# Patient Record
Sex: FEMALE | Race: OTHER | Employment: UNEMPLOYED | URBAN - METROPOLITAN AREA
[De-identification: names, ages, dates, MRNs, and addresses within clinical notes are randomized per-mention and may not be internally consistent; named-entity substitution may affect disease eponyms.]

---

## 2024-03-04 ENCOUNTER — HOSPITAL ENCOUNTER (EMERGENCY)
Facility: HOSPITAL | Age: 29
Discharge: HOME | End: 2024-03-04
Attending: EMERGENCY MEDICINE | Admitting: EMERGENCY MEDICINE
Payer: MEDICAID

## 2024-03-04 VITALS
OXYGEN SATURATION: 97 % | HEIGHT: 62 IN | HEART RATE: 109 BPM | WEIGHT: 154.1 LBS | TEMPERATURE: 100.2 F | DIASTOLIC BLOOD PRESSURE: 58 MMHG | RESPIRATION RATE: 20 BRPM | BODY MASS INDEX: 28.36 KG/M2 | SYSTOLIC BLOOD PRESSURE: 121 MMHG

## 2024-03-04 DIAGNOSIS — J11.1 INFLUENZA: Primary | ICD-10-CM

## 2024-03-04 LAB
BACTERIA UR QL AUTO: ABNORMAL /HPF
BILIRUB UR QL STRIP: NEGATIVE
CLARITY UR: CLEAR
COLOR UR: ABNORMAL
EXT PREGNANCY TEST URINE: NEGATIVE
EXT. CONTROL: NORMAL
FLUAV RNA RESP QL NAA+PROBE: POSITIVE
FLUBV RNA RESP QL NAA+PROBE: NEGATIVE
GLUCOSE UR STRIP-MCNC: NEGATIVE MG/DL
HGB UR QL STRIP.AUTO: NEGATIVE
KETONES UR STRIP-MCNC: NEGATIVE MG/DL
LEUKOCYTE ESTERASE UR QL STRIP: ABNORMAL
MUCOUS THREADS UR QL AUTO: ABNORMAL
NITRITE UR QL STRIP: NEGATIVE
NON-SQ EPI CELLS URNS QL MICRO: ABNORMAL /HPF
PH UR STRIP.AUTO: 5.5 [PH]
PROT UR STRIP-MCNC: NEGATIVE MG/DL
RBC #/AREA URNS AUTO: ABNORMAL /HPF
RSV RNA RESP QL NAA+PROBE: NEGATIVE
SARS-COV-2 RNA RESP QL NAA+PROBE: NEGATIVE
SP GR UR STRIP.AUTO: 1.02 (ref 1–1.03)
UROBILINOGEN UR STRIP-ACNC: <2 MG/DL
WBC #/AREA URNS AUTO: ABNORMAL /HPF

## 2024-03-04 PROCEDURE — 0241U HB NFCT DS VIR RESP RNA 4 TRGT: CPT | Performed by: EMERGENCY MEDICINE

## 2024-03-04 PROCEDURE — 99284 EMERGENCY DEPT VISIT MOD MDM: CPT | Performed by: EMERGENCY MEDICINE

## 2024-03-04 PROCEDURE — 81001 URINALYSIS AUTO W/SCOPE: CPT | Performed by: EMERGENCY MEDICINE

## 2024-03-04 PROCEDURE — 81025 URINE PREGNANCY TEST: CPT | Performed by: EMERGENCY MEDICINE

## 2024-03-04 PROCEDURE — 99283 EMERGENCY DEPT VISIT LOW MDM: CPT

## 2024-03-04 RX ORDER — ONDANSETRON 4 MG/1
4 TABLET, ORALLY DISINTEGRATING ORAL ONCE
Status: COMPLETED | OUTPATIENT
Start: 2024-03-04 | End: 2024-03-04

## 2024-03-04 RX ORDER — ACETAMINOPHEN 325 MG/1
975 TABLET ORAL ONCE
Status: COMPLETED | OUTPATIENT
Start: 2024-03-04 | End: 2024-03-04

## 2024-03-04 RX ADMIN — ONDANSETRON 4 MG: 4 TABLET, ORALLY DISINTEGRATING ORAL at 19:21

## 2024-03-04 RX ADMIN — ACETAMINOPHEN 975 MG: 325 TABLET, FILM COATED ORAL at 19:21

## 2024-03-05 NOTE — ED PROVIDER NOTES
History  Chief Complaint   Patient presents with    Flu Symptoms     Subjective fevers, cough, vomiting. Took dayquil but threw it up. Symptoms starting this morning     20-year-old female presents emergency department for evaluation of flulike symptoms, patient has fevers chills, vomiting, difficulty with symptoms this morning, also here with younger daughter with similar symptoms as well.        None       History reviewed. No pertinent past medical history.    History reviewed. No pertinent surgical history.    History reviewed. No pertinent family history.  I have reviewed and agree with the history as documented.    E-Cigarette/Vaping    E-Cigarette Use Never User      E-Cigarette/Vaping Substances     Social History     Tobacco Use    Smoking status: Never    Smokeless tobacco: Never   Vaping Use    Vaping status: Never Used   Substance Use Topics    Alcohol use: Yes     Comment: social wine    Drug use: Yes     Types: Marijuana       Review of Systems   Constitutional:  Negative for appetite change, chills, fatigue and fever.   HENT:  Negative for sneezing and sore throat.    Eyes:  Negative for visual disturbance.   Respiratory:  Negative for cough, choking, chest tightness, shortness of breath and wheezing.    Cardiovascular:  Negative for chest pain and palpitations.   Gastrointestinal:  Negative for abdominal pain, constipation, diarrhea, nausea and vomiting.   Genitourinary:  Negative for difficulty urinating and dysuria.   Neurological:  Negative for dizziness, weakness, light-headedness, numbness and headaches.   All other systems reviewed and are negative.      Physical Exam  Physical Exam  Vitals and nursing note reviewed.   Constitutional:       General: She is not in acute distress.     Appearance: She is well-developed. She is not diaphoretic.   HENT:      Head: Normocephalic and atraumatic.   Eyes:      Pupils: Pupils are equal, round, and reactive to light.   Neck:      Vascular: No JVD.       Trachea: No tracheal deviation.   Cardiovascular:      Rate and Rhythm: Normal rate and regular rhythm.      Heart sounds: Normal heart sounds. No murmur heard.     No friction rub. No gallop.   Pulmonary:      Effort: Pulmonary effort is normal. No respiratory distress.      Breath sounds: Normal breath sounds. No wheezing or rales.   Abdominal:      General: Bowel sounds are normal. There is no distension.      Palpations: Abdomen is soft.      Tenderness: There is no abdominal tenderness. There is no guarding or rebound.   Skin:     General: Skin is warm and dry.      Coloration: Skin is not pale.   Neurological:      Mental Status: She is alert and oriented to person, place, and time.      Cranial Nerves: No cranial nerve deficit.      Motor: No abnormal muscle tone.   Psychiatric:         Behavior: Behavior normal.         Vital Signs  ED Triage Vitals   Temperature Pulse Respirations Blood Pressure SpO2   03/04/24 1849 03/04/24 1849 03/04/24 1849 03/04/24 1849 03/04/24 1849   100.2 °F (37.9 °C) (!) 123 (!) 24 117/67 98 %      Temp Source Heart Rate Source Patient Position - Orthostatic VS BP Location FiO2 (%)   03/04/24 1849 03/04/24 1849 03/04/24 1849 03/04/24 1849 --   Oral Monitor Sitting Left arm       Pain Score       03/04/24 1921       Med Not Given for Pain - for MAR use only           Vitals:    03/04/24 1849 03/04/24 2005   BP: 117/67 121/58   Pulse: (!) 123 (!) 109   Patient Position - Orthostatic VS: Sitting Sitting         Visual Acuity      ED Medications  Medications   acetaminophen (TYLENOL) tablet 975 mg (975 mg Oral Given 3/4/24 1921)   ondansetron (ZOFRAN-ODT) dispersible tablet 4 mg (4 mg Oral Given 3/4/24 1921)       Diagnostic Studies  Results Reviewed       Procedure Component Value Units Date/Time    FLU/RSV/COVID - if FLU/RSV clinically relevant [[id number]]  (Abnormal) Collected: 03/04/24 1915    Lab Status: Final result Specimen: Nares from Nose Updated: 03/04/24 1959     SARS-CoV-2  Negative     INFLUENZA A PCR Positive     INFLUENZA B PCR Negative     RSV PCR Negative    Narrative:      FOR PEDIATRIC PATIENTS - copy/paste COVID Guidelines URL to browser: [email or web address]    SARS-CoV-2 assay is a Nucleic Acid Amplification assay intended for the  qualitative detection of nucleic acid from SARS-CoV-2 in nasopharyngeal  swabs. Results are for the presumptive identification of SARS-CoV-2 RNA.    Positive results are indicative of infection with SARS-CoV-2, the virus  causing COVID-19, but do not rule out bacterial infection or co-infection  with other viruses. Laboratories within the [place] and its  territories are required to report all positive results to the appropriate  public health authorities. Negative results do not preclude SARS-CoV-2  infection and should not be used as the sole basis for treatment or other  patient management decisions. Negative results must be combined with  clinical observations, patient history, and epidemiological information.  This test has not been FDA cleared or approved.    This test has been authorized by FDA under an Emergency Use Authorization  (EUA). This test is only authorized for the duration of time the  declaration that circumstances exist justifying the authorization of the  emergency use of an in vitro diagnostic tests for detection of SARS-CoV-2  virus and/or diagnosis of COVID-19 infection under section 564(b)(1) of  the Act, 21 U.S.C. 360bbb-3(b)(1), unless the authorization is terminated  or revoked sooner. The test has been validated but independent review by FDA  and CLIA is pending.    Test performed using [employer]: This RT-PCR assay targets N2,  a region unique to SARS-CoV-2. A conserved region in the E-gene was chosen  for pan-Sarbecovirus detection which includes SARS-CoV-2.    According to CMS-2020-01-R, this platform meets the definition of high-throughput  technology.    Urine Microscopic [[id number]]  (Abnormal) Collected: 03/04/24 1927    Lab Status: Final result Specimen: Urine, Clean Catch Updated: 03/04/24 1937     RBC, UA 1-2 /hpf      WBC, UA 2-4 /hpf      Epithelial Cells Occasional /hpf      Bacteria, UA None Seen /hpf      MUCUS THREADS Occasional    UA w Reflex to Microscopic w Reflex to Culture [[id number]]  (Abnormal) Collected: 03/04/24 1927    Lab Status: Final result Specimen: Urine, Clean Catch Updated: 03/04/24 1937     Color, UA Light Yellow     Clarity, UA Clear     Specific Gravity, UA 1.024     pH, UA 5.5     Leukocytes, UA Trace     Nitrite, UA Negative     Protein, UA Negative mg/dl      Glucose, UA Negative mg/dl      Ketones, UA Negative mg/dl      Urobilinogen, UA <2.0 mg/dl      Bilirubin, UA Negative     Occult Blood, UA Negative    POCT pregnancy, urine [[id number]]  (Normal) Resulted: 03/04/24 1929    Lab Status: Final result Updated: 03/04/24 1929     EXT Preg Test, Ur Negative     Control Valid                   No orders to display              Procedures  Procedures         ED Course                               SBIRT 22yo+      Flowsheet Row Most Recent Value   Initial Alcohol Screen: US AUDIT-C     1. How often do you have a drink containing alcohol? 0 Filed at: 03/04/2024 1851   2. How many drinks containing alcohol do you have on a typical day you are drinking?  0 Filed at: 03/04/2024 1851   3a. Male UNDER 65: How often do you have five or more drinks on one occasion? 0 Filed at: 03/04/2024 1851   3b. FEMALE Any Age, or MALE 65+: How often do you have 4 or more drinks on one occassion? 0 Filed at: 03/04/2024 1851   Audit-C Score 0 Filed at: 03/04/2024 1851   QUYEN: How many times in the past year have you...    Used an illegal drug or used a prescription medication for non-medical reasons? Never Filed at: 03/04/2024 1851                      Medical Decision Making  28-year-old female presenting to the emergency department for  flulike symptoms will check swab and reassess.    Amount and/or Complexity of Data Reviewed  Labs: ordered.    Risk  OTC drugs.  Prescription drug management.             Disposition  Final diagnoses:   Influenza     Time reflects when diagnosis was documented in both MDM as applicable and the Disposition within this note       Time User Action Codes Description Comment    3/4/2024  9:04 PM Otis Cox Add [J11.1] Influenza           ED Disposition       ED Disposition   Discharge    Condition   Stable    Date/Time   Mon Mar 4, 2024 2104    Comment   Christoph Casey discharge to home/self care.                   Follow-up Information       Follow up With Specialties Details Why Contact Info    [employer]    [phone]              There are no discharge medications for this patient.      No discharge procedures on file.    PDMP Review       None            ED Provider  Electronically Signed by             Otis Cox MD  03/05/24 1969

## 2025-03-06 ENCOUNTER — ULTRASOUND (OUTPATIENT)
Age: 30
End: 2025-03-06
Payer: COMMERCIAL

## 2025-03-06 VITALS — DIASTOLIC BLOOD PRESSURE: 74 MMHG | SYSTOLIC BLOOD PRESSURE: 124 MMHG | BODY MASS INDEX: 28.17 KG/M2 | WEIGHT: 154 LBS

## 2025-03-06 DIAGNOSIS — Z34.90 EARLY STAGE OF PREGNANCY: ICD-10-CM

## 2025-03-06 DIAGNOSIS — O21.9 NAUSEA AND VOMITING IN PREGNANCY: ICD-10-CM

## 2025-03-06 DIAGNOSIS — N91.2 AMENORRHEA: Primary | ICD-10-CM

## 2025-03-06 PROCEDURE — 99204 OFFICE O/P NEW MOD 45 MIN: CPT | Performed by: STUDENT IN AN ORGANIZED HEALTH CARE EDUCATION/TRAINING PROGRAM

## 2025-03-06 PROCEDURE — 76817 TRANSVAGINAL US OBSTETRIC: CPT | Performed by: STUDENT IN AN ORGANIZED HEALTH CARE EDUCATION/TRAINING PROGRAM

## 2025-03-06 RX ORDER — METOCLOPRAMIDE 10 MG/1
10 TABLET ORAL 4 TIMES DAILY PRN
Qty: 30 TABLET | Refills: 0 | Status: SHIPPED | OUTPATIENT
Start: 2025-03-06

## 2025-03-06 NOTE — PROGRESS NOTES
Pt is here for early ultrasound       LMP 24  Regular Cycles   10 weeks 5 days   LAZARA 25  [place] Pregnancy   No Vaginal Bleeding   Nausea and Vomiting, lost 8lbs since    Blood Type N/A    No complications with previous pregnancy/delivery     Ultrasound Probe Disinfection    A transvaginal ultrasound was performed.   Prior to use, disinfection was performed with High Level Disinfection Process (Trophon)  Probe serial number [id number]:  [id number] was used    PASTORA MIRZA MA  25  8:26 AM

## 2025-03-06 NOTE — PROGRESS NOTES
Name: Christoph Casey      : 1995      MRN: [id number]  Encounter Provider: Edda Diaz MD  Encounter Date: 3/6/2025   Encounter department: [facility] [place]  :  Assessment & Plan  Amenorrhea    Orders:    [facility] US OB < 14 weeks single or first gestation level 1    Early stage of pregnancy  Early pregnancy at 10 weeks 5 days with a calculated LAZARA of 2025 based on LMP c/w early ultrasound    - Genetic screening options reviewed. She is interested in NIPT, so MFM referral placed  - Prenatal care reviewed  - Pregnancy precautions reviewed    RTO for OB interview and PN-1 visit    Orders:    Ambulatory Referral to Maternal Fetal Medicine; Future    Nausea and vomiting in pregnancy  Reviewed treatment options for management of nause and vomiting in pregnancy. Safe andeffective use of reglan reviewed and rx sent    Orders:    metoclopramide (Reglan) 10 mg tablet; Take 1 tablet (10 mg total) by mouth 4 (four) times a day as needed (nausea)        History of Present Illness   HPI  Christoph Casey is a 29 y.o. female who presents     She presents today for verification of pregnancy.  female, Patient's last menstrual period was 2024 (exact date).. Menses are regular. This pregnancy was unplanned, but welcomed.   She is accompanied by her fiancee today.     OBHx is significant for 2 term vaginal deliveries.   Not Taking a prenatal vitamin at the moment. Prior marijuana use, but stopped when she learned of the pregnancy.      Severe vomiting, though she experiences this in each of her pregnancies. Has lost 10# due to vomiting every day since 25. Has not tried anything to treat as of yet. No vaginal bleeding, cramping, contractions.         Review of Systems as above       Objective   /74 (BP Location: Right arm, Patient Position: Sitting, Cuff Size: Standard)   Wt 69.9 kg (154 lb)   LMP 2024 (Exact Date)   BMI 28.17 kg/m²       Physical Exam  Vitals and nursing note reviewed.   Constitutional:       General: She is not in acute distress.     Appearance: She is well-developed.   HENT:      Head: Normocephalic and atraumatic.   Cardiovascular:      Rate and Rhythm: Normal rate.   Pulmonary:      Effort: Pulmonary effort is normal. No respiratory distress.   Genitourinary:     General: Normal vulva.      Cervix: No cervical motion tenderness or cervical bleeding.   Skin:     General: Skin is warm and dry.      Capillary Refill: Capillary refill takes less than 2 seconds.   Neurological:      Mental Status: She is alert.   Psychiatric:         Mood and Affect: Mood normal.

## 2025-03-10 PROBLEM — Z3A.12 12 WEEKS GESTATION OF PREGNANCY: Status: ACTIVE | Noted: 2025-03-10

## 2025-03-14 ENCOUNTER — INITIAL PRENATAL (OUTPATIENT)
Dept: OBGYN CLINIC | Facility: CLINIC | Age: 30
End: 2025-03-14
Payer: COMMERCIAL

## 2025-03-14 VITALS
HEIGHT: 62 IN | HEART RATE: 89 BPM | SYSTOLIC BLOOD PRESSURE: 125 MMHG | WEIGHT: 151.8 LBS | BODY MASS INDEX: 27.94 KG/M2 | DIASTOLIC BLOOD PRESSURE: 80 MMHG

## 2025-03-14 DIAGNOSIS — Z36.9 ENCOUNTER FOR ANTENATAL SCREENING: Primary | ICD-10-CM

## 2025-03-14 DIAGNOSIS — Z34.81 PRENATAL CARE, SUBSEQUENT PREGNANCY, FIRST TRIMESTER: ICD-10-CM

## 2025-03-14 DIAGNOSIS — Z31.430 ENCOUNTER OF FEMALE FOR TESTING FOR GENETIC DISEASE CARRIER STATUS FOR PROCREATIVE MANAGEMENT: ICD-10-CM

## 2025-03-14 PROCEDURE — 99211 OFF/OP EST MAY X REQ PHY/QHP: CPT

## 2025-03-14 NOTE — PATIENT INSTRUCTIONS
Congratulations!! Please review our Pregnancy Essential Guide and [facility] L&D Virtual tour from our networks website.     [facility] Pregnancy Essentials Guide  [facility] Women's [facility] ([email or web address])     [facility] - Virtual Tour ([employer])         Check out “Baby & Me Hospital Readiness Class” from [facility] on [employer].   The video is available for your viewing pleasure at [email or web address]

## 2025-03-14 NOTE — PROGRESS NOTES
OB INTAKE INTERVIEW  Patient is 29 y.o. who presents for OB intake at 11 wks  She is accompanied by her significant other Too during this encounter  The father of her baby (Too Shepherd) is involved in the pregnancy and is 29 years old.      Last Menstrual Period: 24  Ultrasound: Measured 9 weeks 5 days on 3/6  Estimated Date of Delivery: 25 confirmed by 9 week US    Signs/Symptoms of Pregnancy  Current pregnancy symptoms: nausea vomiting, weight loss, fatigue, diarrhea, dizzy spells (laying down room spins for a few seconds)  Constipation no  Headaches no  Cramping/spotting no  PICA cravings no    Diabetes-  Body mass index is 27.76 kg/m².  If patient has 1 or more, please order early 1 hour GTT  History of GDM no  BMI >35 no  History of PCOS or current metformin use (should stop for 7 days prior to 1hr GTT unless pre-existing diabetes)  no  History of LGA/macrosomic infant (4000g/9lbs) no    If patient has 2 or more, please order early 1 hour GTT  BMI>30 no  AMA no  First degree relative with type 2 diabetes no  History of chronic HTN, hyperlipidemia, elevated A1C no  High risk race (, , ,  or ) YES    Hypertension- if you answer yes to any of the following, please order baseline preeclampsia labs (cbc, comprehensive metabolic panel, urine protein creatinine ratio, uric acid)  History of of chronic HTN no  History of gestational HTN no  History of preeclampsia, eclampsia, or HELLP syndrome no  History of diabetes no  History of lupus,sjogrens syndrome, kidney disease no    Thyroid- if yes order TSH with reflex T4  History of thyroid disease no    Bleeding Disorder or Hx of DVT-patient or first degree relative with history of. Order the following if not done previously.   (Factor V, antithrombin III, prothrombin gene mutation, protein C and S Ag, lupus anticoagulant, anticardiolipin, beta-2 glycoprotein)   no    OB/GYN-  History of  abnormal pap smear no       Date of last pap smear never had one-  History of HPV no  History of Herpes/HSV no  History of other STI (gonorrhea, chlamydia, trich) no  History of prior  YES  History of prior  no  History of  delivery prior to 36 weeks 6 days no  History of Varicella or Vaccination had vaccines  History of blood transfusion no  Ok for blood transfusion YES    Substance screening-   History of tobacco use no  Currently using tobacco no  Substance Use Screen Level (N/A, LOW, HIGH) HIGH RISK- uses marijuana for nausea- aware of UDS    MRSA Screening-   Does the pt have a hx of MRSA? no    Immunizations:  Influenza vaccine given this season no  Discussed Tdap vaccine yes, declines  Discussed COVID Vaccine no    Genetic/MFM-  Do you or your partner have a history of any of the following in yourselves or first degree relatives?  Cystic fibrosis no  Spinal muscular atrophy no  Hemoglobinopathy/Sickle Cell/Thalassemia no  Fragile X Intellectual Disability no    If yes, discuss Carrier Screening and recommend consultation with MFM/Genetic Counseling and place specific [facility] Referral for.    If no, discuss Carrier Screening being completed once in a lifetime as a standard of care lab test. Place orders for Cystic Fibrosis Gene Test (OMJ990) and Spinal Muscular Atrophy DNA ([id number])      Appointment for Nuchal Translucency Ultrasound at [facility] scheduled for 3/17      Interview education  [facility] Pregnancy Essentials Book reviewed, discussed and attached to their AVS yes    Nurse/Family Partnership- patient may qualify no; referral placed no    Prenatal lab work scripts yes  Extra labs ordered:  Carrier screening     Aspirin/Preeclampsia Screen    Risk Level Risk Factor Recommendation   LOW Prior Uncomplicated full-term delivery YES No Aspirin recommendation        MODERATE Nulliparity no Recommend low-dose aspirin if     BMI>30 no 2 or more moderate risk factors    Family History Preeclampsia  (mother/sister) no     35yr old or greater no     Black Race, Concern for SDOH/Low Socioeconomic no     IVF Pregnancy  no     Personal History Risks (low birth weight, prior adverse preg outcome, >10yr preg interval) no         HIGH History of Preeclampsia no Recommend low-dose aspirin if     Multifetal gestation no 1 or more high risk factors    Chronic HTN no     Type 1 or 2 Diabetes no     Renal Disease no     Autoimmune Disease  no      Contraindications to ASA therapy:  NSAID/ ASA allergy: no  Nasal polyps: no  Asthma with history of ASA induced bronchospasm: no  Relative contraindications:  History of GI bleed: no  Active peptic ulcer disease: no  Severe hepatic dysfunction: no    Patient should be recommended to take ASA 162mg during this pregnancy from 12-36wks to lower her risk of preeclampsia: low risk criteria met- does not meet criteria           The patient has a history now or in prior pregnancy notable for:  drug use marijuana for nausea and EPDS- 10 pt declined referral to psych at this time but does know she can ask for it at any time.       Details that I feel the provider should be aware of: This is a planned and welcomed pregnancy for Christoph and her significant other Too.  [place] [facility] [place] [place] is her 3rd child and Supa second. She has two daughters both born via . She is overall feeling well so far, some nausea with weight loss. Patient is aware of PN1 labs and to have them completed before her next appointment. Carrier screening discussed patient aware of prior authorization and scheduling. She is aware of Urine drug screen due to marijuana use in pregnancy- she is aware we advise her to discontinue use in pregnancy. Blue folder was given and reviewed today.     PN1 visit scheduled. The patient was oriented to our practice, the navigator role, reviewed delivering physicians and [facility] for Delivery. All questions were answered.    Interviewed by: Yenifer  Karon RN

## 2025-03-17 ENCOUNTER — APPOINTMENT (OUTPATIENT)
Dept: LAB | Facility: HOSPITAL | Age: 30
End: 2025-03-17
Payer: COMMERCIAL

## 2025-03-17 ENCOUNTER — ROUTINE PRENATAL (OUTPATIENT)
Dept: PERINATAL CARE | Facility: OTHER | Age: 30
End: 2025-03-17
Payer: COMMERCIAL

## 2025-03-17 VITALS
HEIGHT: 62 IN | WEIGHT: 154.8 LBS | DIASTOLIC BLOOD PRESSURE: 60 MMHG | HEART RATE: 92 BPM | BODY MASS INDEX: 28.49 KG/M2 | SYSTOLIC BLOOD PRESSURE: 110 MMHG

## 2025-03-17 DIAGNOSIS — Z3A.12 12 WEEKS GESTATION OF PREGNANCY: ICD-10-CM

## 2025-03-17 DIAGNOSIS — Z36.82 ENCOUNTER FOR (NT) NUCHAL TRANSLUCENCY SCAN: ICD-10-CM

## 2025-03-17 DIAGNOSIS — Z34.90 EARLY STAGE OF PREGNANCY: ICD-10-CM

## 2025-03-17 DIAGNOSIS — Z34.81 PRENATAL CARE, SUBSEQUENT PREGNANCY, FIRST TRIMESTER: Primary | ICD-10-CM

## 2025-03-17 DIAGNOSIS — Z36.9 ENCOUNTER FOR ANTENATAL SCREENING OF MOTHER: Primary | ICD-10-CM

## 2025-03-17 DIAGNOSIS — Z36.9 ENCOUNTER FOR ANTENATAL SCREENING: ICD-10-CM

## 2025-03-17 LAB
ABO GROUP BLD: NORMAL
BASOPHILS # BLD AUTO: 0.02 THOUSANDS/ÂΜL (ref 0–0.1)
BASOPHILS NFR BLD AUTO: 0 % (ref 0–1)
BLD GP AB SCN SERPL QL: NEGATIVE
EOSINOPHIL # BLD AUTO: 0.17 THOUSAND/ÂΜL (ref 0–0.61)
EOSINOPHIL NFR BLD AUTO: 3 % (ref 0–6)
ERYTHROCYTE [DISTWIDTH] IN BLOOD BY AUTOMATED COUNT: 12.5 % (ref 11.6–15.1)
HCT VFR BLD AUTO: 37.9 % (ref 34.8–46.1)
HGB BLD-MCNC: 12.2 G/DL (ref 11.5–15.4)
IMM GRANULOCYTES # BLD AUTO: 0.02 THOUSAND/UL (ref 0–0.2)
IMM GRANULOCYTES NFR BLD AUTO: 0 % (ref 0–2)
LYMPHOCYTES # BLD AUTO: 1.5 THOUSANDS/ÂΜL (ref 0.6–4.47)
LYMPHOCYTES NFR BLD AUTO: 24 % (ref 14–44)
MCH RBC QN AUTO: 30.1 PG (ref 26.8–34.3)
MCHC RBC AUTO-ENTMCNC: 32.2 G/DL (ref 31.4–37.4)
MCV RBC AUTO: 94 FL (ref 82–98)
MONOCYTES # BLD AUTO: 0.44 THOUSAND/ÂΜL (ref 0.17–1.22)
MONOCYTES NFR BLD AUTO: 7 % (ref 4–12)
NEUTROPHILS # BLD AUTO: 4.07 THOUSANDS/ÂΜL (ref 1.85–7.62)
NEUTS SEG NFR BLD AUTO: 66 % (ref 43–75)
NRBC BLD AUTO-RTO: 0 /100 WBCS
PLATELET # BLD AUTO: 230 THOUSANDS/UL (ref 149–390)
PMV BLD AUTO: 10.2 FL (ref 8.9–12.7)
RBC # BLD AUTO: 4.05 MILLION/UL (ref 3.81–5.12)
RH BLD: POSITIVE
SPECIMEN EXPIRATION DATE: NORMAL
WBC # BLD AUTO: 6.22 THOUSAND/UL (ref 4.31–10.16)

## 2025-03-17 PROCEDURE — 86901 BLOOD TYPING SEROLOGIC RH(D): CPT

## 2025-03-17 PROCEDURE — 99243 OFF/OP CNSLTJ NEW/EST LOW 30: CPT

## 2025-03-17 PROCEDURE — 86850 RBC ANTIBODY SCREEN: CPT

## 2025-03-17 PROCEDURE — 76813 OB US NUCHAL MEAS 1 GEST: CPT | Performed by: OBSTETRICS & GYNECOLOGY

## 2025-03-17 PROCEDURE — 86803 HEPATITIS C AB TEST: CPT

## 2025-03-17 PROCEDURE — 86762 RUBELLA ANTIBODY: CPT

## 2025-03-17 PROCEDURE — 87340 HEPATITIS B SURFACE AG IA: CPT

## 2025-03-17 PROCEDURE — 85025 COMPLETE CBC W/AUTO DIFF WBC: CPT

## 2025-03-17 PROCEDURE — 87389 HIV-1 AG W/HIV-1&-2 AB AG IA: CPT

## 2025-03-17 PROCEDURE — 86780 TREPONEMA PALLIDUM: CPT

## 2025-03-17 PROCEDURE — 36415 COLL VENOUS BLD VENIPUNCTURE: CPT

## 2025-03-17 PROCEDURE — 86706 HEP B SURFACE ANTIBODY: CPT

## 2025-03-17 PROCEDURE — 86900 BLOOD TYPING SEROLOGIC ABO: CPT

## 2025-03-17 NOTE — PROGRESS NOTES
"[facility]: Ms. Casey was seen today at 12w2d gestational age for nuchal translucency ultrasound.  See ultrasound report under \"OB Procedures\" tab.      Christoph denies complaints currently. She is a . Her OB history is significant for 2 prior term vaginal deliveries in  and , with the births of female neonates weighing 6 pounds 12.3 ounces and 6 pounds 11.1 ounce, respectively.  Past medical and surgical history were reviewed prior to today's visit, with evidence of no significant history. She denies current use of alcohol, drugs of abuse, or tobacco. Medications and allergies are up to date in Epic. Christoph's partner Jj, who is 29 years old is a . Family history was reviewed using an office screening tool, and is noncontributory regarding the current pregnancy.     Her vitals are documented above. On exam she is well-appearing and in no distress. Head is normocephalic and atraumatic. There is no nasal congestion. Extraocular movements are intact.  Pulmonary effort is normal, there is no respiratory distress. Range of motion is normal. There is no focal neurologic deficit present. She is alert and oriented. Behavior, thought content, and judgment are normal.    Thank you very much for your kind referral of Christoph. Her history is as above.     Evaluation and Management:  The limitations of ultrasound were reviewed. Medical decision-making for this encounter was moderate (diagnosis low, data, and risk low).     Recommendations are as follows:     1. We reviewed the availability of genetic screening, as well as diagnostic testing, which are available to all pregnant women. We reviewed limitations, risks, and benefits of screening and testing. She elected to proceed with Non Invasive Prenatal Screening (NIPS), including fetal sex chromosome analysis with gender. The patient took the blood testing kit with her to an outside lab.  MSAFP screening should be " ordered through your office at 15-20 weeks gestation, and completed prior to fetal anatomic survey. She does not wish to pursue diagnostic testing at this time.     A detailed anatomic survey as well as transvaginal cervical length screening are recommended between 20-22 weeks gestation.    2. Current recommendations regarding  Flu, COVID and RSV (third trimester) vaccines by the American College of Obstetricians and Gynecologists and the Society for Maternal-Fetal Medicine provide reassuring pregnancy outcome information after vaccination. Increased severity of infections and the resultant maternal and fetal complications that can arise with a severe infections include  labor.  Vaccines have been found to generate  antibodies in pregnant and lactating women similar to that observed in non-pregnant women. Vaccine-induced antibody levels were significantly greater than the levels found in response to natural infection. Immune transfer of these antibodies to neonates is found to occur via the placenta and breast milk.    At the conclusion of today's encounter, all questions were answered to her satisfaction. Thank you very much for this kind referral and please do not hesitate to contact me with any further questions or concerns.    Rosaura TANG

## 2025-03-17 NOTE — PROGRESS NOTES
Patient chose to have [facility] [id number] Non-Invasive Prenatal Screen [id number] PLUS w/ SCA, WITH fetal sex.  Patient choose to be billed through insurance.     Patient given brochure and is aware [facility] will contact patient's insurance and coordinate coverage.  Provided [facility] contact information. General inquiries [phone], Cost estimates [phone] and [facility] Billing [phone]. Website [email or web address].     Blood collection tubes labeled with patient identifiers (name, medical record number, and date of birth).     Filled out [facility] order form. Patient chose to be sent to an outpatient lab to complete blood work. .      If patient chose to have blood work drawn at a [facility] lab we requested patient notify MFM (via phone call or [employer] message) when blood collected so office can follow up on results.       Maternal Fetal Medicine will have results in approximately 5-7 business days and will call patient or notify via [employer].  Patient aware viewing lab result online will reveal fetal sex if ordered.    Patient verbalized understanding of all instructions and no questions at this time.

## 2025-03-17 NOTE — LETTER
"   Date: 3/17/2025    Edda Diaz MD  [address]  [address]  [place] [address]    Patient: Christoph Casey   YOB: 1995   Date of Visit: 3/17/2025   Gestational age 12w4d   Nature of this communication: Routine       This patient was seen recently in our  office.  Please see ultrasound report under \"OB Procedures\" tab.  Please don't hesitate to contact our office with any concerns or questions.      Sincerely,      Arline Ham MD  Attending Physician, Maternal-Fetal Medicine  [facility]      "

## 2025-03-18 PROBLEM — O99.320 MARIJUANA USE DURING PREGNANCY: Status: ACTIVE | Noted: 2025-03-18

## 2025-03-18 PROBLEM — E55.9 VITAMIN D DEFICIENCY: Status: RESOLVED | Noted: 2023-02-23 | Resolved: 2025-03-18

## 2025-03-18 PROBLEM — F12.90 MARIJUANA USE DURING PREGNANCY: Status: ACTIVE | Noted: 2025-03-18

## 2025-03-18 PROBLEM — E55.9 VITAMIN D DEFICIENCY: Status: ACTIVE | Noted: 2023-02-23

## 2025-03-18 LAB
HBV SURFACE AB SER-ACNC: 12.4 MIU/ML
HBV SURFACE AG SER QL: NORMAL
HCV AB SER QL: NORMAL
HIV 1+2 AB+HIV1 P24 AG SERPL QL IA: NORMAL
RUBV IGG SERPL IA-ACNC: 70.2 IU/ML
TREPONEMA PALLIDUM IGG+IGM AB [PRESENCE] IN SERUM OR PLASMA BY IMMUNOASSAY: NORMAL

## 2025-03-18 NOTE — PROGRESS NOTES
Name: Christoph Casey      : 1995      MRN: [id number]  Encounter Provider: KITTY Fu  Encounter Date: 3/20/2025   Encounter department: [facility] [place]  :  Assessment & Plan  Encounter for supervision of other normal pregnancy, second trimester  Here for 1st OB appointment accompanied by her FOB. Feels well. Pap/GC/CT done today. Denies LOF/CTX/VB. No pregnancy concerns expressed by patient. All questions answered. Oriented to our practice.          12 weeks gestation of pregnancy  Maternity 21 was ordered yesterday by [facility] [place], result pending  Pap/GC/CT done  L2 on            Marijuana use during pregnancy  Discussed marijuana use for nausea, last used 2 days ago.  She states that her nausea is improving so she will try not to use it anymore.  We discussed other relief measures including Zofran which she defers for now.

## 2025-03-18 NOTE — ASSESSMENT & PLAN NOTE
Maternity 21 was ordered yesterday by [facility], result pending  Pap/GC/CT done  L2 on 5/12

## 2025-03-18 NOTE — ASSESSMENT & PLAN NOTE
Discussed marijuana use for nausea, last used 2 days ago.  She states that her nausea is improving so she will try not to use it anymore.  We discussed other relief measures including Zofran which she defers for now.

## 2025-03-19 ENCOUNTER — RESULTS FOLLOW-UP (OUTPATIENT)
Dept: OBGYN CLINIC | Facility: CLINIC | Age: 30
End: 2025-03-19

## 2025-03-20 ENCOUNTER — INITIAL PRENATAL (OUTPATIENT)
Age: 30
End: 2025-03-20
Payer: COMMERCIAL

## 2025-03-20 VITALS
HEIGHT: 62 IN | DIASTOLIC BLOOD PRESSURE: 82 MMHG | WEIGHT: 151 LBS | SYSTOLIC BLOOD PRESSURE: 112 MMHG | BODY MASS INDEX: 27.79 KG/M2

## 2025-03-20 DIAGNOSIS — Z3A.12 12 WEEKS GESTATION OF PREGNANCY: ICD-10-CM

## 2025-03-20 DIAGNOSIS — F12.90 MARIJUANA USE DURING PREGNANCY: ICD-10-CM

## 2025-03-20 DIAGNOSIS — O99.320 MARIJUANA USE DURING PREGNANCY: ICD-10-CM

## 2025-03-20 DIAGNOSIS — Z34.82 ENCOUNTER FOR SUPERVISION OF OTHER NORMAL PREGNANCY, SECOND TRIMESTER: Primary | ICD-10-CM

## 2025-03-20 DIAGNOSIS — Z11.3 SCREEN FOR STD (SEXUALLY TRANSMITTED DISEASE): ICD-10-CM

## 2025-03-20 DIAGNOSIS — Z12.4 CERVICAL CANCER SCREENING: ICD-10-CM

## 2025-03-20 LAB
SL AMB  POCT GLUCOSE, UA: NORMAL
SL AMB POCT URINE PROTEIN: NORMAL

## 2025-03-20 PROCEDURE — 87591 N.GONORRHOEAE DNA AMP PROB: CPT | Performed by: NURSE PRACTITIONER

## 2025-03-20 PROCEDURE — G0145 SCR C/V CYTO,THINLAYER,RESCR: HCPCS | Performed by: NURSE PRACTITIONER

## 2025-03-20 PROCEDURE — 99213 OFFICE O/P EST LOW 20 MIN: CPT | Performed by: NURSE PRACTITIONER

## 2025-03-20 PROCEDURE — 81002 URINALYSIS NONAUTO W/O SCOPE: CPT | Performed by: NURSE PRACTITIONER

## 2025-03-20 PROCEDURE — 87491 CHLMYD TRACH DNA AMP PROBE: CPT | Performed by: NURSE PRACTITIONER

## 2025-03-21 ENCOUNTER — APPOINTMENT (OUTPATIENT)
Dept: LAB | Facility: HOSPITAL | Age: 30
End: 2025-03-21
Attending: STUDENT IN AN ORGANIZED HEALTH CARE EDUCATION/TRAINING PROGRAM
Payer: COMMERCIAL

## 2025-03-21 DIAGNOSIS — Z36.9 ENCOUNTER FOR ANTENATAL SCREENING: ICD-10-CM

## 2025-03-21 DIAGNOSIS — Z31.430 ENCOUNTER OF FEMALE FOR TESTING FOR GENETIC DISEASE CARRIER STATUS FOR PROCREATIVE MANAGEMENT: ICD-10-CM

## 2025-03-21 DIAGNOSIS — Z34.81 PRENATAL CARE, SUBSEQUENT PREGNANCY, FIRST TRIMESTER: ICD-10-CM

## 2025-03-21 LAB
AMPHETAMINES SERPL QL SCN: NEGATIVE
BACTERIA UR QL AUTO: ABNORMAL /HPF
BARBITURATES UR QL: NEGATIVE
BENZODIAZ UR QL: NEGATIVE
BILIRUB UR QL STRIP: NEGATIVE
CFDNA.FET/CFDNA.TOTAL SFR FETUS: NORMAL %
CFDNA.FET/CFDNA.TOTAL SFR FETUS: NORMAL %
CITATION REF LAB TEST: NORMAL
CLARITY UR: ABNORMAL
COCAINE UR QL: NEGATIVE
COLOR UR: YELLOW
FENTANYL UR QL SCN: NEGATIVE
FET 13+18+21+X+Y ANEUP PLAS.CFDNA: NEGATIVE
FET CHR 21 TS PLAS.CFDNA QL: NEGATIVE
FET CHR 21 TS PLAS.CFDNA QL: NEGATIVE
FET MS X RISK WBC.DNA+CFDNA QL: NOT DETECTED
FET SEX PLAS.CFDNA DOSAGE CFDNA: NORMAL
FET TS 13 RISK PLAS.CFDNA QL: NEGATIVE
FET X + Y ANEUP RISK PLAS.CFDNA SEQ-IMP: NOT DETECTED
GA EST FROM CONCEPTION DATE: NORMAL D
GESTATIONAL AGE > 9:: YES
GLUCOSE UR STRIP-MCNC: NEGATIVE MG/DL
HGB UR QL STRIP.AUTO: NEGATIVE
HYDROCODONE UR QL SCN: NEGATIVE
KETONES UR STRIP-MCNC: ABNORMAL MG/DL
LAB DIRECTOR NAME PROVIDER: NORMAL
LABORATORY COMMENT REPORT: NORMAL
LEUKOCYTE ESTERASE UR QL STRIP: ABNORMAL
LIMITATIONS OF THE TEST: NORMAL
METHADONE UR QL: NEGATIVE
MUCOUS THREADS UR QL AUTO: ABNORMAL
NEGATIVE PREDICTIVE VALUE: NORMAL
NITRITE UR QL STRIP: NEGATIVE
NON-SQ EPI CELLS URNS QL MICRO: ABNORMAL /HPF
OPIATES UR QL SCN: NEGATIVE
OXYCODONE+OXYMORPHONE UR QL SCN: NEGATIVE
PCP UR QL: NEGATIVE
PERFORMANCE CHARACTERISTICS: NORMAL
PH UR STRIP.AUTO: 6.5 [PH]
POSITIVE PREDICTIVE VALUE: NORMAL
PROT UR STRIP-MCNC: ABNORMAL MG/DL
RBC #/AREA URNS AUTO: ABNORMAL /HPF
REF LAB TEST METHOD: NORMAL
SL AMB NOTE:: NORMAL
SP GR UR STRIP.AUTO: 1.03 (ref 1–1.03)
TEST PERFORMANCE INFO SPEC: NORMAL
THC UR QL: POSITIVE
UROBILINOGEN UR STRIP-ACNC: <2 MG/DL
WBC #/AREA URNS AUTO: ABNORMAL /HPF

## 2025-03-21 PROCEDURE — 80307 DRUG TEST PRSMV CHEM ANLYZR: CPT

## 2025-03-21 PROCEDURE — 81001 URINALYSIS AUTO W/SCOPE: CPT

## 2025-03-21 PROCEDURE — 87086 URINE CULTURE/COLONY COUNT: CPT

## 2025-03-21 PROCEDURE — 36415 COLL VENOUS BLD VENIPUNCTURE: CPT

## 2025-03-22 ENCOUNTER — RESULTS FOLLOW-UP (OUTPATIENT)
Facility: HOSPITAL | Age: 30
End: 2025-03-22

## 2025-03-22 LAB — BACTERIA UR CULT: NORMAL

## 2025-03-23 LAB
C TRACH DNA SPEC QL NAA+PROBE: NEGATIVE
N GONORRHOEA DNA SPEC QL NAA+PROBE: NEGATIVE

## 2025-03-25 ENCOUNTER — RESULTS FOLLOW-UP (OUTPATIENT)
Age: 30
End: 2025-03-25

## 2025-03-25 LAB
LAB AP GYN PRIMARY INTERPRETATION: NORMAL
LAB AP LMP: NORMAL
Lab: NORMAL

## 2025-04-04 ENCOUNTER — APPOINTMENT (OUTPATIENT)
Dept: LAB | Facility: HOSPITAL | Age: 30
End: 2025-04-04
Attending: STUDENT IN AN ORGANIZED HEALTH CARE EDUCATION/TRAINING PROGRAM
Payer: COMMERCIAL

## 2025-04-04 DIAGNOSIS — Z34.81 PRENATAL CARE, SUBSEQUENT PREGNANCY, FIRST TRIMESTER: ICD-10-CM

## 2025-04-04 DIAGNOSIS — Z31.430 ENCOUNTER OF FEMALE FOR TESTING FOR GENETIC DISEASE CARRIER STATUS FOR PROCREATIVE MANAGEMENT: ICD-10-CM

## 2025-04-04 DIAGNOSIS — Z36.9 ENCOUNTER FOR ANTENATAL SCREENING: ICD-10-CM

## 2025-04-04 LAB
CFTR FULL MUT ANL BLD/T SEQ: NORMAL
CITATION REF LAB TEST: NORMAL
CLINICAL INFO: NORMAL
ETHNIC BACKGROUND STATED: NORMAL
GENE DIS ANL CARRIER INTERP-IMP: NORMAL
INDICATION: NORMAL
LAB DIRECTOR NAME PROVIDER: NORMAL
RECOMMENDATION PATIENT DOC-IMP: NORMAL
REF LAB TEST METHOD: NORMAL
SERVICE CMNT-IMP: NORMAL
SPECIMEN SOURCE: NORMAL

## 2025-04-04 PROCEDURE — 36415 COLL VENOUS BLD VENIPUNCTURE: CPT

## 2025-04-09 LAB
CITATION REF LAB TEST: NORMAL
CLINICAL INFO: NORMAL
ETHNIC BACKGROUND STATED: NORMAL
GENE DIS ANL CARRIER INTERP-IMP: NORMAL
GENE MUT TESTED BLD/T: NORMAL
LAB DIRECTOR NAME PROVIDER: NORMAL
REASON FOR REFERRAL (NARRATIVE): NORMAL
RECOMMENDATION PATIENT DOC-IMP: NORMAL
REF LAB TEST METHOD: NORMAL
SERVICE CMNT-IMP: NORMAL
SMN1 GENE MUT ANL BLD/T: NORMAL
SPECIMEN SOURCE: NORMAL

## 2025-04-17 ENCOUNTER — ROUTINE PRENATAL (OUTPATIENT)
Age: 30
End: 2025-04-17
Payer: COMMERCIAL

## 2025-04-17 VITALS
BODY MASS INDEX: 27.09 KG/M2 | HEIGHT: 62 IN | SYSTOLIC BLOOD PRESSURE: 122 MMHG | HEART RATE: 94 BPM | DIASTOLIC BLOOD PRESSURE: 72 MMHG | WEIGHT: 147.2 LBS

## 2025-04-17 DIAGNOSIS — Z36.9 ENCOUNTER FOR ANTENATAL SCREENING: ICD-10-CM

## 2025-04-17 DIAGNOSIS — Z34.82 PRENATAL CARE, SUBSEQUENT PREGNANCY, SECOND TRIMESTER: ICD-10-CM

## 2025-04-17 DIAGNOSIS — Z3A.16 16 WEEKS GESTATION OF PREGNANCY: Primary | ICD-10-CM

## 2025-04-17 LAB
SL AMB  POCT GLUCOSE, UA: NORMAL
SL AMB POCT URINE PROTEIN: NORMAL

## 2025-04-17 PROCEDURE — 99213 OFFICE O/P EST LOW 20 MIN: CPT | Performed by: OBSTETRICS & GYNECOLOGY

## 2025-04-17 PROCEDURE — 81002 URINALYSIS NONAUTO W/O SCOPE: CPT | Performed by: OBSTETRICS & GYNECOLOGY

## 2025-04-17 NOTE — PROGRESS NOTES
"Problem List Items Addressed This Visit       16 weeks gestation of pregnancy - Primary    Pt doing well, no complaints  Nausea has resolved, starting to be able to eat and drink more  Headaches recently- drinking about 5 bottles of water a day. Urine dark and concentrated in office today- encouraged to increase hydration to 8-10 bottles of water daily. To call if headaches not relieved by tylenol.   No FM. Denies ctx, vb and lof.   AFP ordered and reviewed  Level 2 scheduled 25  Precautions reviewed. RTO in 4 weeks          Other Visit Diagnoses         Encounter for  screening        Relevant Orders    Alpha fetoprotein, maternal      Prenatal care, subsequent pregnancy, second trimester        Relevant Orders    POCT urine dip (Completed)            Christoph Casey is a 29 y.o.  at 16w5d who presents today for routine prenatal visit.     /72 (BP Location: Left arm, Patient Position: Sitting, Cuff Size: Adult)   Pulse 94   Ht 5' 2\" (1.575 m)   Wt 66.8 kg (147 lb 3.2 oz)   LMP 2024 (Exact Date)   BMI 26.92 kg/m²           "

## 2025-04-17 NOTE — ASSESSMENT & PLAN NOTE
Pt doing well, no complaints  Nausea has resolved, starting to be able to eat and drink more  Headaches recently- drinking about 5 bottles of water a day. Urine dark and concentrated in office today- encouraged to increase hydration to 8-10 bottles of water daily. To call if headaches not relieved by tylenol.   No FM. Denies ctx, vb and lof.   AFP ordered and reviewed  Level 2 scheduled 5/12/25  Precautions reviewed. RTO in 4 weeks

## 2025-04-17 NOTE — PROGRESS NOTES
Patient presents for a routine prenatal visit.    16W5D  No LOF,bleeding,discharge or cramping.  Having random headaches and states she has been losing weight.  Anatomy u/s: scheduled for   AFP ordered today.

## 2025-04-25 ENCOUNTER — APPOINTMENT (OUTPATIENT)
Dept: LAB | Facility: HOSPITAL | Age: 30
End: 2025-04-25
Payer: COMMERCIAL

## 2025-04-25 DIAGNOSIS — Z36.9 ENCOUNTER FOR ANTENATAL SCREENING: ICD-10-CM

## 2025-04-25 PROCEDURE — 36415 COLL VENOUS BLD VENIPUNCTURE: CPT

## 2025-04-25 PROCEDURE — 82105 ALPHA-FETOPROTEIN SERUM: CPT

## 2025-04-27 LAB
2ND TRIMESTER 4 SCREEN SERPL-IMP: NORMAL
AFP ADJ MOM SERPL: 0.81
AFP INTERP AMN-IMP: NORMAL
AFP INTERP SERPL-IMP: NORMAL
AFP INTERP SERPL-IMP: NORMAL
AFP SERPL-MCNC: 35.2 NG/ML
AGE AT DELIVERY: 29.8 YR
GA METHOD: NORMAL
GA: 17.9 WEEKS
IDDM PATIENT QL: NO
MULTIPLE PREGNANCY: NO
NEURAL TUBE DEFECT RISK FETUS: NORMAL %

## 2025-04-28 ENCOUNTER — TELEPHONE (OUTPATIENT)
Dept: OBGYN CLINIC | Facility: CLINIC | Age: 30
End: 2025-04-28

## 2025-04-28 ENCOUNTER — RESULTS FOLLOW-UP (OUTPATIENT)
Dept: OBGYN CLINIC | Facility: CLINIC | Age: 30
End: 2025-04-28

## 2025-04-28 NOTE — TELEPHONE ENCOUNTER
----- Message from Yenifer CORDOBA sent at 3/14/2025 10:10 AM EDT -----  Regardinnd Trimester Check In  -5/10

## 2025-05-12 ENCOUNTER — ROUTINE PRENATAL (OUTPATIENT)
Dept: PERINATAL CARE | Facility: OTHER | Age: 30
End: 2025-05-12
Payer: COMMERCIAL

## 2025-05-12 VITALS
WEIGHT: 152.4 LBS | BODY MASS INDEX: 28.05 KG/M2 | DIASTOLIC BLOOD PRESSURE: 60 MMHG | SYSTOLIC BLOOD PRESSURE: 116 MMHG | HEART RATE: 102 BPM | HEIGHT: 62 IN

## 2025-05-12 DIAGNOSIS — Z36.86 ENCOUNTER FOR ANTENATAL SCREENING FOR CERVICAL LENGTH: ICD-10-CM

## 2025-05-12 DIAGNOSIS — Z3A.20 20 WEEKS GESTATION OF PREGNANCY: Primary | ICD-10-CM

## 2025-05-12 DIAGNOSIS — Z36.3 ENCOUNTER FOR ANTENATAL SCREENING FOR MALFORMATION: ICD-10-CM

## 2025-05-12 PROCEDURE — 99213 OFFICE O/P EST LOW 20 MIN: CPT | Performed by: STUDENT IN AN ORGANIZED HEALTH CARE EDUCATION/TRAINING PROGRAM

## 2025-05-12 PROCEDURE — 76817 TRANSVAGINAL US OBSTETRIC: CPT | Performed by: STUDENT IN AN ORGANIZED HEALTH CARE EDUCATION/TRAINING PROGRAM

## 2025-05-12 PROCEDURE — 76805 OB US >/= 14 WKS SNGL FETUS: CPT | Performed by: STUDENT IN AN ORGANIZED HEALTH CARE EDUCATION/TRAINING PROGRAM

## 2025-05-12 NOTE — PROGRESS NOTES
"[facility]: Ms. Casey was seen today for anatomic survey and cervical length screening ultrasound.  See ultrasound report under \"OB Procedures\" tab.      MDM:   I. Diagnoses/Problems addressed:  Minimal  II.  Data: Limited  III.  Risk of morbidity: Minimal    Please don't hesitate to contact our office with any concerns or questions.  -Paula Gates MD  "

## 2025-05-15 ENCOUNTER — ROUTINE PRENATAL (OUTPATIENT)
Age: 30
End: 2025-05-15
Payer: COMMERCIAL

## 2025-05-15 VITALS
BODY MASS INDEX: 28.67 KG/M2 | SYSTOLIC BLOOD PRESSURE: 110 MMHG | WEIGHT: 155.8 LBS | HEIGHT: 62 IN | DIASTOLIC BLOOD PRESSURE: 68 MMHG | HEART RATE: 74 BPM

## 2025-05-15 DIAGNOSIS — Z3A.20 20 WEEKS GESTATION OF PREGNANCY: Primary | ICD-10-CM

## 2025-05-15 DIAGNOSIS — Z34.82 PRENATAL CARE, SUBSEQUENT PREGNANCY, SECOND TRIMESTER: ICD-10-CM

## 2025-05-15 LAB
SL AMB  POCT GLUCOSE, UA: NORMAL
SL AMB POCT URINE PROTEIN: NORMAL

## 2025-05-15 PROCEDURE — 81002 URINALYSIS NONAUTO W/O SCOPE: CPT | Performed by: OBSTETRICS & GYNECOLOGY

## 2025-05-15 PROCEDURE — 99213 OFFICE O/P EST LOW 20 MIN: CPT | Performed by: OBSTETRICS & GYNECOLOGY

## 2025-05-15 NOTE — ASSESSMENT & PLAN NOTE
Pt doing well  +FM. Denies ctx, vb and lof.   AFP negative  Sp level 2 on 5/12- has f/u for growth u/s scheduled on 6/13  Precautions reviewed. RTO in 4 weeks

## 2025-05-15 NOTE — PROGRESS NOTES
"Problem List Items Addressed This Visit       20 weeks gestation of pregnancy - Primary    Pt doing well  +FM. Denies ctx, vb and lof.   AFP negative  Sp level 2 on - has f/u for growth u/s scheduled on   Precautions reviewed. RTO in 4 weeks          Other Visit Diagnoses         Prenatal care, subsequent pregnancy, second trimester        Relevant Orders    POCT urine dip (Completed)            Christoph Casey is a 29 y.o.  at 20w5d who presents today for routine prenatal visit.     /68 (BP Location: Left arm, Patient Position: Sitting, Cuff Size: Adult)   Pulse 74   Ht 5' 2\" (1.575 m)   Wt 70.7 kg (155 lb 12.8 oz)   LMP 2024 (Exact Date)   BMI 28.50 kg/m²         "

## 2025-05-15 NOTE — PROGRESS NOTES
Patient presents for a routine prenatal visit    20W5D  Good Fetal Movement  No LOF,bleeding,discharge or cramping.  No current complaints at this time.  AFP completed: neg  Anatomy u/s:

## 2025-06-13 ENCOUNTER — ANCILLARY PROCEDURE (OUTPATIENT)
Dept: PERINATAL CARE | Facility: OTHER | Age: 30
End: 2025-06-13
Attending: OBSTETRICS & GYNECOLOGY
Payer: COMMERCIAL

## 2025-06-13 ENCOUNTER — TELEPHONE (OUTPATIENT)
Age: 30
End: 2025-06-13

## 2025-06-13 ENCOUNTER — ULTRASOUND (OUTPATIENT)
Dept: PERINATAL CARE | Facility: OTHER | Age: 30
End: 2025-06-13
Payer: COMMERCIAL

## 2025-06-13 VITALS
HEIGHT: 62 IN | HEART RATE: 93 BPM | WEIGHT: 158.4 LBS | DIASTOLIC BLOOD PRESSURE: 62 MMHG | BODY MASS INDEX: 29.15 KG/M2 | SYSTOLIC BLOOD PRESSURE: 100 MMHG

## 2025-06-13 DIAGNOSIS — Z3A.24 24 WEEKS GESTATION OF PREGNANCY: ICD-10-CM

## 2025-06-13 DIAGNOSIS — Z03.74 PROBLEM WITH FETAL GROWTH NOT FOUND: Primary | ICD-10-CM

## 2025-06-13 DIAGNOSIS — Z3A.23 23 WEEKS GESTATION OF PREGNANCY: ICD-10-CM

## 2025-06-13 PROCEDURE — 99213 OFFICE O/P EST LOW 20 MIN: CPT | Performed by: OBSTETRICS & GYNECOLOGY

## 2025-06-13 PROCEDURE — 76816 OB US FOLLOW-UP PER FETUS: CPT | Performed by: OBSTETRICS & GYNECOLOGY

## 2025-06-13 PROCEDURE — NC001 PR NO CHARGE: Performed by: OBSTETRICS & GYNECOLOGY

## 2025-06-13 NOTE — TELEPHONE ENCOUNTER
Patient called in to review her due date. She states provider in office told her 9/4/25, but her chart says 10/4/25. Reviewed office note, confirmed 10/4/25 is date on note. Patient verbalized understanding and is thankful.

## 2025-06-13 NOTE — PROGRESS NOTES
Christoph Casey  has no complaints today at 23w6d. She reports fetal movements and does not report any vaginal bleeding or signs of labor.  Her recently completed fetal testing revealed a normal NIPT and MSAFP. She is here today for an ultrasound for fetal growth.  Her dating scan and the rest of her scans persistently are measuring 6 to 7 days behind.  She showed me her fabrizio on her phone that marks when her periods start and when they end.  She had a period that started on 11/12/2024 and a period that started on 12/21/2024.  12/21/2024 is the first day of her last period.  This particular cycle was 39 days long which suggest we should not be using her last menstrual period for dating.  I recommend using her dating scan for dating as it will be more accurate.     Ultrasound findings:  The ultrasound today based on her 9-week dating scan shows normal interval fetal growth and fluid.    Pregnancy ultrasound has limitations and is unable to detect all forms of fetal congenital abnormalities.      Follow up recommended:   Recommend 1 last growth scan in 8 weeks to confirm normal interval growth.    Pre visit time reviewing her records   5 minutes  Face to face time 10 minutes  Post visit time on documentation of note, updating her problem list, adding orders and prescriptions 10 minutes.  Procedures that were completed today were charged separately.   The level of decision making was low level complexity.    Juli Peres MD

## 2025-06-13 NOTE — LETTER
June 13, 2025     Mildred Carpenter PA-C  [address] [facility] [address]    Patient: Christoph Casey   YOB: 1995   Date of Visit: 6/13/2025       Dear Dr. Mildred Carpenter PA-C:    Thank you for referring Christoph Casey to me for evaluation. Below are my notes for this consultation.    If you have questions, please do not hesitate to call me. I look forward to following your patient along with you.         Sincerely,        Juli Peres MD        CC: No Recipients    Juli Peres MD  6/13/2025 10:03 AM  Sign when Signing Visit    Christoph Casey  has no complaints today at 23w6d. She reports fetal movements and does not report any vaginal bleeding or signs of labor.  Her recently completed fetal testing revealed a normal NIPT and MSAFP. She is here today for an ultrasound for fetal growth.  Her dating scan and the rest of her scans persistently are measuring 6 to 7 days behind.  She showed me her fabrizio on her phone that marks when her periods start and when they end.  She had a period that started on 11/12/2024 and a period that started on 12/21/2024.  12/21/2024 is the first day of her last period.  This particular cycle was 39 days long which suggest we should not be using her last menstrual period for dating.  I recommend using her dating scan for dating as it will be more accurate.     Ultrasound findings:  The ultrasound today based on her 9-week dating scan shows normal interval fetal growth and fluid.    Pregnancy ultrasound has limitations and is unable to detect all forms of fetal congenital abnormalities.      Follow up recommended:   Recommend 1 last growth scan in 8 weeks to confirm normal interval growth.    Pre visit time reviewing her records   5 minutes  Face to face time 10 minutes  Post visit time on documentation of note, updating her problem list, adding orders and prescriptions 10 minutes.  Procedures that were completed today were charged separately.   The level of  decision making was low level complexity.    Juli Peres MD

## 2025-06-18 NOTE — PROGRESS NOTES
Prenatal Visit  Prenatal Visit  Name: Christoph Casey      : 1995      MRN: [id number]  Encounter Provider: Mildred Carpenter PA-C  Encounter Date: 2025   Encounter department: [facility] [place]    :  Assessment & Plan  20 weeks gestation of pregnancy    Blood Type:. O+  Pap 3/20/25 NILM  GC/CT -  Neg  PN1 Labs- UTD  28 Week Labs-     Blue folder- has  Yellow folder-     NIPS-   AFP- Normal    Level 2- 25     TDAP -      Delivery consent-  Breast pump -   Pediatrician -  L&D forms-     Perineal massage -  GBS swab -   IOL -     Marijuana use during pregnancy     Patient stopped use ~ 2 months ago    Prenatal care, subsequent pregnancy, second trimester    Doing well  Precautions reviewed to call for - Vaginal bldg, LOF, abnormal d/c or > 4 ctx in an hour.   FM not consistent until around 28 wks.     Questions have been answered to her satisfaction.    RTO in 4 weeks or sooner if needed        Subjective:   History of Present Illness   History of Present Illness     Christoph is a 29 y.o.  24w4d here for a routine PNV  She denies any cramping, LOF/unusual discharge or VB.  She has noted fetal movement.       Objective:  Objective   /78 (BP Location: Left arm, Patient Position: Sitting, Cuff Size: Standard)   Pulse 89   Wt 72.6 kg (160 lb)   LMP  (Exact Date)   SpO2 99%   BMI 29.26 kg/m²     Pregravid Weight/BMI: 68.9 kg (151 lb 12.8 oz) (BMI 27.76)  Current Weight: 72.6 kg (160 lb)   Total Weight Gain: 3.719 kg (8 lb 3.2 oz)     Fetal Heart Rate: 150 Fundal Height (cm): 24 cm      Physical Exam  Constitutional:       Appearance: Normal appearance.   HENT:      Head: Normocephalic and atraumatic.     Eyes:      Extraocular Movements: Extraocular movements intact.     Pulmonary:      Effort: Pulmonary effort is normal.     Musculoskeletal:         General: Normal range of motion.     Neurological:      Mental Status: She is alert.     Skin:      General: Skin is warm and dry.     Psychiatric:         Mood and Affect: Mood normal.         Behavior: Behavior normal.       Mildred Carpenter PA-C  6/19/2025

## 2025-06-19 ENCOUNTER — ROUTINE PRENATAL (OUTPATIENT)
Dept: OBGYN CLINIC | Facility: CLINIC | Age: 30
End: 2025-06-19
Payer: COMMERCIAL

## 2025-06-19 VITALS
OXYGEN SATURATION: 99 % | SYSTOLIC BLOOD PRESSURE: 120 MMHG | DIASTOLIC BLOOD PRESSURE: 78 MMHG | WEIGHT: 160 LBS | BODY MASS INDEX: 29.26 KG/M2 | HEART RATE: 89 BPM

## 2025-06-19 DIAGNOSIS — Z34.82 PRENATAL CARE, SUBSEQUENT PREGNANCY, SECOND TRIMESTER: ICD-10-CM

## 2025-06-19 DIAGNOSIS — Z3A.20 20 WEEKS GESTATION OF PREGNANCY: Primary | ICD-10-CM

## 2025-06-19 DIAGNOSIS — O99.320 MARIJUANA USE DURING PREGNANCY: ICD-10-CM

## 2025-06-19 DIAGNOSIS — F12.90 MARIJUANA USE DURING PREGNANCY: ICD-10-CM

## 2025-06-19 PROCEDURE — 99213 OFFICE O/P EST LOW 20 MIN: CPT

## 2025-06-19 NOTE — ASSESSMENT & PLAN NOTE
Blood Type:. O+  Pap 3/20/25 NILM  GC/CT -  Neg  PN1 Labs- UTD  28 Week Labs-     Blue folder- has  Yellow folder-     NIPS-   AFP- Normal    Level 2- 8/8/25     TDAP -      Delivery consent-  Breast pump -   Pediatrician -  L&D forms-     Perineal massage -  GBS swab -   IOL -

## 2025-07-01 PROBLEM — Z3A.26 26 WEEKS GESTATION OF PREGNANCY: Status: ACTIVE | Noted: 2025-03-10

## 2025-07-01 NOTE — PATIENT INSTRUCTIONS
Patient Education     Pregnancy - The Sixth Month   About this topic   It is important for you to learn how to take care of yourself to help you have a healthy baby and safe delivery. It is good to have health care throughout your pregnancy.  The sixth month of your pregnancy starts around week 24 and lasts through week 28. By knowing how far along you are, you can learn what is normal for this stage of your pregnancy and plan for what is next.  General   Your body   During the sixth month of your pregnancy, here are some things you can expect.  You may:  Start to gain a little more weight. It is normal to gain about 10 to 15 pounds (4.5 to 7 kg) total in your first 6 months.  Have problems like back pain, dry eyes, or aching hands and wrists  Itching of palms, abdomen, or soles of your feet  Swelling of ankles, fingers, or face.  Need to urinate more frequently.  Have problems with hemorrhoids. Be sure to eat plenty of fresh fruits and vegetables to increase the fiber in your diet. Drink plenty of water to help keep your stools soft.  Continue having [address] contractions. You may feel your belly squeezing or getting tight.  Have a glucose test to test for gestational diabetes.  Have more headaches. Talk to your doctor about how to help with them.  See stretch marks on your belly, breasts, or legs. Stay active to try and keep good muscle tone.  See an increase in vaginal discharge. Talk to your doctor about what to expect.  Your baby's growth and development:  Your baby looks like a very small  baby.  They are able to live outside of your womb but would need a lot of help breathing, eating, and staying warm in an intensive care unit.  Your baby has times of being awake and times of being asleep. The baby’s eyelids are able to open and close.  They move more and have hiccups.  Your baby is about 14 inches (36 cm) long and weighs about 2 pounds (900 gm). Your baby is about the size of an eggplant.  Baby  may be able to hear voices.  Things to Think About   Avoid alcohol, drugs, tobacco products, and second hand smoke  Eat small meals throughout the day instead of larger meals.  Avoid spicy, greasy, or fatty foods.  Avoid lying down or bending over for around 3 hours after eating  Warm baths are fine to help you relax. Avoid hot tubs while you are pregnant.  Avoid straining when having bowel movements.  Learning how to care for your baby. You may want to sign up for classes on how to take care of a .  Are you planning on going back to work after having your baby? It’s not too early to think about .  Consider starting your birth plan if you have specific needs or wishes for delivery.  When do I need to call the doctor?   Contractions every 10 minutes or more often that do not go away with drinking water or position changes  Low, dull back pain that does not go away  Pressure in your pelvis that feels like your baby is pushing down  A gush or constant trickle of watery or bloody fluid leaking from your vagina  Cramps in your lower belly that come and go or are constant  Change in your baby's movement  Fever of 100.4°F (38°C) or higher  Little to no movement felt by baby in 2 hours. Your baby should be moving at least 10 times every 2 hours.  Headache that does not go away; blurry vision; seeing spots or halos; increase in swelling in your hands, feet, or face; and pain under your ribs on the right side  Vaginal bleeding with or without pain  After a car accident, fall, or any trauma to your belly  Having thoughts of harming yourself or others, or do not feel safe at home  Last Reviewed Date   2020  Consumer Information Use and Disclaimer   This generalized information is a limited summary of diagnosis, treatment, and/or medication information. It is not meant to be comprehensive and should be used as a tool to help the user understand and/or assess potential diagnostic and treatment options. It does  "NOT include all information about conditions, treatments, medications, side effects, or risks that may apply to a specific patient. It is not intended to be medical advice or a substitute for the medical advice, diagnosis, or treatment of a health care provider based on the health care provider's examination and assessment of a patient’s specific and unique circumstances. Patients must speak with a health care provider for complete information about their health, medical questions, and treatment options, including any risks or benefits regarding use of medications. This information does not endorse any treatments or medications as safe, effective, or approved for treating a specific patient. UpToDate, Inc. and its affiliates disclaim any warranty or liability relating to this information or the use thereof. The use of this information is governed by the Terms of Use, available at https://www.[employer].Berggi/en/know/clinical-effectiveness-terms   Copyright   Copyright © 2024 UpToDate, Inc. and its affiliates and/or licensors. All rights reserved.  Patient Education     Your baby's movement before birth   The Basics   Written by the doctors and editors at [employer]   When should I start feeling my baby move? -- It depends. Most people first feel their baby moving in the uterus between about 16 and 20 weeks of pregnancy. It might take longer to feel movement if this is your first pregnancy or if the placenta is in the front of your uterus.  What kinds of movements should I feel? -- When you first feel your baby move, it might feel like a gentle flutter in your belly. This is sometimes called \"quickening.\" As the baby grows, their movements will get stronger. You will probably feel them kicking, rolling, and stretching. Later in pregnancy, you might be able to see and feel the baby moving from the outside.  You might notice that your baby is more active at certain times of the day or night. Even before birth, babies " "have periods of being asleep and awake. When your baby is sleeping, you might notice that they do not move as much.  Should I keep track of my baby's movements? -- If your pregnancy is healthy, you probably do not need to count or record your baby's movements. Feeling regular movement is a good sign that the baby is doing well.  In some cases, your doctor or midwife might ask you to keep track of your baby's movements. If so, they will tell you how to do this and when to call them.  A change in your baby's movements does not always mean that there is a problem. But in some cases, it can be a sign that the baby is having trouble. If your doctor or midwife is concerned, they can do tests to check on the baby.  If I am asked to track movement, how should I do it? -- There are different ways of tracking your baby's movement. This is sometimes called \"kick counting.\"  Your doctor or midwife will tell you exactly what to track. For example, they might ask you to write down:   How long it takes to feel 10 kicks or movements   How many times your baby moves in 1 hour  Many experts consider at least 10 movements in 2 hours to be a sign that the baby is doing well. But there is no specific cutoff for exactly how much movement is healthy or unhealthy. Some babies are more active than others, and some pregnant people feel movement more easily than others. The main goal of kick counting is to get to know your baby's normal patterns so you can tell if anything changes.  If you are doing kick counting:   Choose a time of day when your baby is usually active.   Find a quiet place where you will not be distracted.   Lie down on your side in a comfortable position.   Check the clock, or set a timer.   Each time you feel your baby move or kick, write down the time. Some people use a smartphone fabrizio to keep track.   If your baby seems less active than usual, try moving around, eating a snack, and emptying your bladder. This can help " wake the baby up if they are asleep.   Stop counting after you have felt 10 kicks, or after the length of time your doctor or midwife told you.  When should I call the doctor? -- Call your doctor or midwife for advice if:   You have concerns about your baby's movement.   Your baby is moving less than they normally do.   You notice a sudden change in the pattern of your baby's movements.   You have any other symptoms that worry you.  All topics are updated as new evidence becomes available and our peer review process is complete.  This topic retrieved from [employer] on: Feb 26, 2024.  Topic 204794 Version 1.0  Release: 32.2.4 - C32.56  © 2024 UpToDate, Inc. and/or its affiliates. All rights reserved.  Consumer Information Use and Disclaimer   Disclaimer: This generalized information is a limited summary of diagnosis, treatment, and/or medication information. It is not meant to be comprehensive and should be used as a tool to help the user understand and/or assess potential diagnostic and treatment options. It does NOT include all information about conditions, treatments, medications, side effects, or risks that may apply to a specific patient. It is not intended to be medical advice or a substitute for the medical advice, diagnosis, or treatment of a health care provider based on the health care provider's examination and assessment of a patient's specific and unique circumstances. Patients must speak with a health care provider for complete information about their health, medical questions, and treatment options, including any risks or benefits regarding use of medications. This information does not endorse any treatments or medications as safe, effective, or approved for treating a specific patient. UpToDate, Inc. and its affiliates disclaim any warranty or liability relating to this information or the use thereof.The use of this information is governed by the Terms of Use, available at  https://www.[employer]tersDiamond Communicationsuwer.com/en/know/clinical-effectiveness-terms. 2024© [employer] and its affiliates and/or licensors. All rights reserved.  Copyright   © 2024 [employer] and/or its affiliates. All rights reserved.

## 2025-07-01 NOTE — ASSESSMENT & PLAN NOTE
Christoph Casey is a 29 y.o.  26w4d  TWG 5.987 kg (13 lb 3.2 oz)  28 week labs ordered ,pt is leaving for [place] tomorrow and will be gone 3 weeks she will have them done tomorrow morning prior to leaving  Denies LOF/CTX/VB.    AFP completed Neg  Vaccines discussed    labor precautions reviewed.   Encouraged adequate hydration and nutrition  Pregnancy Essential guide and Baby and Me center web site recommended.

## 2025-07-02 ENCOUNTER — ROUTINE PRENATAL (OUTPATIENT)
Dept: OBGYN CLINIC | Facility: CLINIC | Age: 30
End: 2025-07-02
Payer: COMMERCIAL

## 2025-07-02 VITALS — SYSTOLIC BLOOD PRESSURE: 112 MMHG | DIASTOLIC BLOOD PRESSURE: 70 MMHG | BODY MASS INDEX: 30.18 KG/M2 | WEIGHT: 165 LBS

## 2025-07-02 DIAGNOSIS — Z3A.26 26 WEEKS GESTATION OF PREGNANCY: Primary | ICD-10-CM

## 2025-07-02 PROCEDURE — 99213 OFFICE O/P EST LOW 20 MIN: CPT | Performed by: OBSTETRICS & GYNECOLOGY

## 2025-07-02 NOTE — PROGRESS NOTES
Problem   26 Weeks Gestation of Pregnancy     26 weeks gestation of pregnancy  Christoph Casey is a 29 y.o.  26w4d  TWG 5.987 kg (13 lb 3.2 oz)  28 week labs ordered ,pt is leaving for [place] tomorrow and will be gone 3 weeks she will have them done tomorrow morning prior to leaving  Denies LOF/CTX/VB.    AFP completed Neg  Vaccines discussed    labor precautions reviewed.   Encouraged adequate hydration and nutrition  Pregnancy Essential guide and Baby and Me center web site recommended.

## 2025-07-02 NOTE — PROGRESS NOTES
Patient is here for prenatal ob visit today.  GA: 26w4d  LAZARA: 10/04/2025  GP:   Blood type: O positive  Denies LOF, VB, CTX  +FM    28wk labs not completed. Reminded to complete.    Blue folder given at intake    No questions or concerns at this time.

## 2025-07-16 PROBLEM — Z3A.28 28 WEEKS GESTATION OF PREGNANCY: Status: ACTIVE | Noted: 2025-03-10

## 2025-07-31 ENCOUNTER — TELEPHONE (OUTPATIENT)
Dept: OBGYN CLINIC | Facility: CLINIC | Age: 30
End: 2025-07-31

## 2025-08-01 PROBLEM — Z3A.31 31 WEEKS GESTATION OF PREGNANCY: Status: ACTIVE | Noted: 2025-03-10

## 2025-08-07 ENCOUNTER — APPOINTMENT (OUTPATIENT)
Dept: LAB | Facility: HOSPITAL | Age: 30
End: 2025-08-07
Payer: COMMERCIAL

## 2025-08-07 LAB — TREPONEMA PALLIDUM IGG+IGM AB [PRESENCE] IN SERUM OR PLASMA BY IMMUNOASSAY: NORMAL

## 2025-08-08 ENCOUNTER — ULTRASOUND (OUTPATIENT)
Dept: PERINATAL CARE | Facility: OTHER | Age: 30
End: 2025-08-08
Payer: COMMERCIAL

## 2025-08-08 ENCOUNTER — ANCILLARY PROCEDURE (OUTPATIENT)
Dept: PERINATAL CARE | Facility: OTHER | Age: 30
End: 2025-08-08
Attending: STUDENT IN AN ORGANIZED HEALTH CARE EDUCATION/TRAINING PROGRAM
Payer: COMMERCIAL

## 2025-08-08 VITALS
SYSTOLIC BLOOD PRESSURE: 110 MMHG | HEART RATE: 98 BPM | WEIGHT: 167 LBS | BODY MASS INDEX: 30.73 KG/M2 | DIASTOLIC BLOOD PRESSURE: 62 MMHG | HEIGHT: 62 IN

## 2025-08-08 DIAGNOSIS — Z03.74 SUSPECTED PROBLEM WITH FETAL GROWTH NOT FOUND: Primary | ICD-10-CM

## 2025-08-08 DIAGNOSIS — Z3A.31 31 WEEKS GESTATION OF PREGNANCY: ICD-10-CM

## 2025-08-08 PROCEDURE — NC001 PR NO CHARGE: Performed by: OBSTETRICS & GYNECOLOGY

## 2025-08-11 ENCOUNTER — TELEPHONE (OUTPATIENT)
Dept: OBGYN CLINIC | Facility: CLINIC | Age: 30
End: 2025-08-11

## 2025-08-11 PROBLEM — Z3A.32 32 WEEKS GESTATION OF PREGNANCY: Status: ACTIVE | Noted: 2025-03-10

## 2025-08-26 PROBLEM — Z3A.34 34 WEEKS GESTATION OF PREGNANCY: Status: ACTIVE | Noted: 2025-03-10
